# Patient Record
Sex: FEMALE | Race: WHITE | NOT HISPANIC OR LATINO | Employment: STUDENT | URBAN - METROPOLITAN AREA
[De-identification: names, ages, dates, MRNs, and addresses within clinical notes are randomized per-mention and may not be internally consistent; named-entity substitution may affect disease eponyms.]

---

## 2017-05-16 ENCOUNTER — HOSPITAL ENCOUNTER (OUTPATIENT)
Dept: NON INVASIVE DIAGNOSTICS | Facility: CLINIC | Age: 22
Discharge: HOME | End: 2017-05-16
Payer: COMMERCIAL

## 2017-05-16 ENCOUNTER — TRANSCRIBE ORDERS (OUTPATIENT)
Dept: ADMINISTRATIVE | Facility: HOSPITAL | Age: 22
End: 2017-05-16

## 2017-05-16 DIAGNOSIS — Z82.49 FAMILY HISTORY OF ISCHEMIC HEART DISEASE: ICD-10-CM

## 2017-05-16 DIAGNOSIS — Z82.49 FAMILY HISTORY OF ISCHEMIC HEART DISEASE: Primary | ICD-10-CM

## 2017-05-16 PROCEDURE — 93005 ELECTROCARDIOGRAM TRACING: CPT

## 2017-05-17 LAB
ATRIAL RATE: 58 BPM
P AXIS: 55 DEGREES
PR INTERVAL: 152 MS
QRS AXIS: 65 DEGREES
QRSD INTERVAL: 86 MS
QT INTERVAL: 406 MS
QTC INTERVAL: 398 MS
T WAVE AXIS: 47 DEGREES
VENTRICULAR RATE: 58 BPM

## 2017-08-01 ENCOUNTER — ALLSCRIPTS OFFICE VISIT (OUTPATIENT)
Dept: OTHER | Facility: OTHER | Age: 22
End: 2017-08-01

## 2017-08-01 PROCEDURE — G0145 SCR C/V CYTO,THINLAYER,RESCR: HCPCS | Performed by: OBSTETRICS & GYNECOLOGY

## 2017-08-03 ENCOUNTER — LAB REQUISITION (OUTPATIENT)
Dept: LAB | Facility: HOSPITAL | Age: 22
End: 2017-08-03
Payer: COMMERCIAL

## 2017-08-03 DIAGNOSIS — Z12.4 ENCOUNTER FOR SCREENING FOR MALIGNANT NEOPLASM OF CERVIX: ICD-10-CM

## 2017-08-03 PROCEDURE — 87591 N.GONORRHOEAE DNA AMP PROB: CPT | Performed by: OBSTETRICS & GYNECOLOGY

## 2017-08-03 PROCEDURE — 87491 CHLMYD TRACH DNA AMP PROBE: CPT | Performed by: OBSTETRICS & GYNECOLOGY

## 2017-08-08 LAB
CHLAMYDIA DNA CVX QL NAA+PROBE: NORMAL
N GONORRHOEA DNA GENITAL QL NAA+PROBE: NORMAL

## 2017-08-09 LAB
LAB AP GYN PRIMARY INTERPRETATION: NORMAL
Lab: NORMAL

## 2018-01-08 ENCOUNTER — ALLSCRIPTS OFFICE VISIT (OUTPATIENT)
Dept: OTHER | Facility: OTHER | Age: 23
End: 2018-01-08

## 2018-01-08 LAB — HCG, QUALITATIVE (HISTORICAL): NEGATIVE

## 2018-01-12 VITALS
SYSTOLIC BLOOD PRESSURE: 108 MMHG | DIASTOLIC BLOOD PRESSURE: 60 MMHG | HEIGHT: 68 IN | BODY MASS INDEX: 19.85 KG/M2 | WEIGHT: 131 LBS

## 2018-01-18 NOTE — MISCELLANEOUS
Message   Date: 02 Aug 2016 2:20 PM EST, Recorded By: Triston Gutierrez For: Mike Mcbride [employer], Self   Phone: [phone] (Home)   Reason: Other   pt is having issues with her insurance covering her birth control    feel because it is too soon    pt will call insurance company           Active Problems    1  Contraception (V25 9) (Z30 9)   2  Denied: History of self breast exam   3  Visit for routine gyn exam (V72 31) (Z01 419)    Current Meds   1  Aviane 0 1-20 MG-MCG Oral Tablet; Take 1 tablet daily; Therapy: 01Aug2016 to (Last Rx:[id number])  Requested for: 01Aug2016 Ordered   2  Aviane 0 1-20 MG-MCG Oral Tablet; Take 1 tablet daily; Therapy: 87Ngw0552 to (Last Rx:[id number])  Requested for: 01Aug2016 Ordered   3  Ipratropium-Albuterol 0 5-2 5 (3) MG/3ML Inhalation Solution; Therapy: 90PDT8887 to Recorded   4  Shar Fe 1 5/30 1 5-30 MG-MCG Oral Tablet; TAKE 1 TABLET DAILY; Therapy: 26EFK2927 to (Evaluate:66Zwg1783)  Requested for: 03NBK5385; Last   Rx:09Jun2016 Ordered   5  Mobic TABS (Meloxicam); Therapy: (Recorded:41Sqc6221) to Recorded    Allergies    1   No Known Drug Allergies    Signatures   Electronically signed by : Anurag Merino, ; Aug  2 2016  2:26PM EST                       (Author)

## 2018-01-23 VITALS
HEIGHT: 68 IN | WEIGHT: 132.38 LBS | DIASTOLIC BLOOD PRESSURE: 72 MMHG | SYSTOLIC BLOOD PRESSURE: 114 MMHG | BODY MASS INDEX: 20.06 KG/M2

## 2018-08-02 ENCOUNTER — ANNUAL EXAM (OUTPATIENT)
Dept: OBGYN CLINIC | Facility: CLINIC | Age: 23
End: 2018-08-02
Payer: COMMERCIAL

## 2018-08-02 VITALS
SYSTOLIC BLOOD PRESSURE: 114 MMHG | DIASTOLIC BLOOD PRESSURE: 72 MMHG | HEIGHT: 68 IN | WEIGHT: 131.2 LBS | BODY MASS INDEX: 19.88 KG/M2

## 2018-08-02 DIAGNOSIS — Z11.3 SCREENING FOR STD (SEXUALLY TRANSMITTED DISEASE): ICD-10-CM

## 2018-08-02 DIAGNOSIS — Z01.419 WOMEN'S ANNUAL ROUTINE GYNECOLOGICAL EXAMINATION: Primary | ICD-10-CM

## 2018-08-02 PROCEDURE — 87491 CHLMYD TRACH DNA AMP PROBE: CPT | Performed by: OBSTETRICS & GYNECOLOGY

## 2018-08-02 PROCEDURE — 87591 N.GONORRHOEAE DNA AMP PROB: CPT | Performed by: OBSTETRICS & GYNECOLOGY

## 2018-08-02 PROCEDURE — 99395 PREV VISIT EST AGE 18-39: CPT | Performed by: OBSTETRICS & GYNECOLOGY

## 2018-08-02 RX ORDER — LORATADINE 10 MG/1
CAPSULE, LIQUID FILLED ORAL
COMMUNITY

## 2018-08-02 RX ORDER — MAGNESIUM 30 MG
30 TABLET ORAL 2 TIMES DAILY
COMMUNITY

## 2018-08-02 RX ORDER — UBIDECARENONE 75 MG
CAPSULE ORAL DAILY
COMMUNITY

## 2018-08-02 RX ORDER — ERGOCALCIFEROL (VITAMIN D2) 10 MCG
TABLET ORAL
COMMUNITY

## 2018-08-02 RX ORDER — COPPER 313.4 MG/1
INTRAUTERINE DEVICE INTRAUTERINE
COMMUNITY
End: 2021-12-28 | Stop reason: ALTCHOICE

## 2018-08-02 NOTE — PATIENT INSTRUCTIONS
Intrauterine Device   WHAT YOU NEED TO KNOW:   What is an intrauterine device? An intrauterine device (IUD) is a type of birth control that is inserted into your uterus  It is a small, flexible piece of plastic with a string on the end  It is inserted and removed by your healthcare provider  IUDs prevent sperm from reaching or fertilizing an egg  IUDs also prevent a fertilized egg from attaching to the uterus and developing into a fetus  What are the most common types of IUDs? · Copper: This type of IUD slowly releases a small amount of copper into your uterus  This IUD can remain in place for up to 10 years  · Hormone-releasing: This type of IUD slowly releases a small amount of progesterone into your uterus  Progesterone is a hormone that is made by your body to help control your periods  This IUD can remain in place for up to 5 years  What are the advantages of an IUD? · Effective: An IUD is 98% to 99% effective in preventing pregnancy  · Easily removable: The IUD can be removed if you decide to have a baby  You may be able to get pregnant as soon as the IUD is removed  · Immediate:  An IUD protects you from pregnancy right after it is inserted  · Convenient:  You do not have to stop sexual activity to insert it or worry about remembering to take your birth control pill  · Safe:  Copper IUDs are safer for some women than oral birth control pills  Examples include women who smoke or have a history of blood clots  · Health effects:  Hormone-releasing IUDs may decrease certain health problems  Examples include bleeding and cramping that happen with your monthly period  What are the risks of an IUD? · An IUD does not protect you from sexually transmitted infections  You may have cramps during the first weeks after you get the IUD  A copper IUD may cause your monthly period to be heavier or more painful  This is more common within the first 3 months after you get the IUD   You may need to have your IUD removed if your bleeding or pain becomes severe  You may have spotting between periods  · There is a small chance that you could get pregnant  Sometimes the IUD cannot be removed after you get pregnant  This increases your risk of a miscarriage or an ectopic pregnancy  Ectopic pregnancy is when the fertilized egg starts to grow somewhere other than your uterus  There is a small risk of an infection within the first 20 days after the IUD is placed  Infection can lead to pelvic inflammatory disease  This can cause infertility  Your uterus may tear when the IUD is inserted  The IUD may slip part or all of the way out of your uterus  How is the IUD inserted? · The IUD is usually inserted during your monthly period  This may help decrease the amount of discomfort you have during the procedure  It also helps ensure that you are not pregnant  You will be asked to lie down and place your feet in stirrups  Your healthcare provider will gently insert a speculum into your vagina  This is the same tool used during a pap smear  The speculum allows your healthcare provider to see inside your vagina to your cervix  The cervix is the opening of your uterus  · Your healthcare provider will clean your cervix with an antiseptic solution to prevent infection  You may be given numbing medicine  A long plastic tube is gently passed through your cervix and into your uterus  This tube has the IUD inside of it  The IUD is pushed out of the tube and into your uterus  You may have cramps as the IUD is inserted  The tube is removed after the IUD is in place  How can I make sure my IUD is still in place? An IUD has a string made of plastic thread  One to 2 inches of this string hangs into your vagina  You cannot see this string, and it will not cause problems when you have sex  Check your IUD string every 3 days for the first 3 months after it is inserted  After that, check the string after each monthly period   Do the following to check the placement of your IUD:  · Wash your hands with soap and warm water  Dry them with a clean towel  · Bend your knees and squat low to the ground  · Gently put your index finger inside your vagina  The cervix is at the top of the vagina and feels like the tip of your nose  Feel for the IUD string  Do not pull on the string  You should not be able to feel the firm plastic of the IUD itself  · Wash your hands after you check your IUD string  Where can I find more information? · [facility] [address] , [place]  Phone: 7- [phone]  Web Address: [email or web address]://[employer].com.[place]/  org  When should I contact my healthcare provider? · You think you are pregnant  · You bleed after you have sex  · You have pain during sex  · You cannot feel the IUD string, the string feels longer, or you feel the plastic of the IUD itself  · You have vaginal discharge that is green, yellow, or has a foul odor  · You have questions or concerns about your condition or care  When should I seek immediate care? · You have severe pain or bleeding during your period  · You have a fever and severe abdominal pain  CARE AGREEMENT:   You have the right to help plan your care  Learn about your health condition and how it may be treated  Discuss treatment options with your caregivers to decide what care you want to receive  You always have the right to refuse treatment  The above information is an  only  It is not intended as medical advice for individual conditions or treatments  Talk to your doctor, nurse or pharmacist before following any medical regimen to see if it is safe and effective for you  © 2017 [employer] Information is for End User's use only and may not be sold, redistributed or otherwise used for commercial purposes   All illustrations and images included in CareNotes® are the copyrighted property of A D A M , Inc  or [employer]

## 2018-08-02 NOTE — PROGRESS NOTES
Assessment/Plan:  1  Yearly exam-Pap smear deferred, self-breast awareness reviewed  2   Contraception-patient had ParaGard IUD inserted 1/8/18  Other than some rare occasional cramping, she denies any problems  Her partner notes no discomfort with sex  She will continue with ParaGard IUD and call with any issues  The IUD string was noted to be 2 cm and the patient was instructed about how to check for the IUD string  3   STD concerns-patient with a new partner within the past year  GC/chlamydia testing was done as per CDC guidelines with patient request   We will defer any blood testing at this time  4   Other-patient starting [employer] school at [employer] in the fall  My congratulations and support were given  She will follow-up in 1 year or as needed  No problem-specific Assessment & Plan notes found for this encounter  Diagnoses and all orders for this visit:    Women's annual routine gynecological examination    Other orders  -     Multiple Vitamin (DAILY VALUE MULTIVITAMIN) TABS; Take by mouth  -     Loratadine (CLARITIN) 10 MG CAPS; Take by mouth  -     cyanocobalamin (VITAMIN B-12) 100 mcg tablet; Take by mouth daily  -     magnesium 30 MG tablet; Take 30 mg by mouth 2 (two) times a day  -     PARAGARD INTRAUTERINE COPPER IUD; by Intrauterine route          Subjective:      Patient ID: Sage Murcia is a 25 y o  female  Patient was seen today for yearly exam   Please see assessment plan for details  The following portions of the patient's history were reviewed and updated as appropriate: allergies, current medications, past family history, past medical history, past social history, past surgical history and problem list     Review of Systems   Constitutional: Negative for chills, diaphoresis, fatigue and fever  Respiratory: Negative for apnea, cough, chest tightness, shortness of breath and wheezing  Cardiovascular: Negative for chest pain, palpitations and leg swelling  Gastrointestinal: Negative for abdominal distention, abdominal pain, anal bleeding, constipation, diarrhea, nausea, rectal pain and vomiting  Genitourinary: Negative for difficulty urinating, dyspareunia, dysuria, frequency, hematuria, menstrual problem, pelvic pain, urgency, vaginal bleeding, vaginal discharge and vaginal pain  Musculoskeletal: Negative for arthralgias, back pain and myalgias  Skin: Negative for color change and rash  Neurological: Negative for dizziness, syncope, light-headedness, numbness and headaches  Hematological: Negative for adenopathy  Does not bruise/bleed easily  Psychiatric/Behavioral: Negative for dysphoric mood and sleep disturbance  The patient is not nervous/anxious  Objective:      /72 (BP Location: Left arm, Patient Position: Sitting, Cuff Size: Adult)   Ht 5' 8" (1 727 m)   Wt 59 5 kg (131 lb 3 2 oz)   LMP 07/22/2018 (Exact Date)   Breastfeeding? No   BMI 19 95 kg/m²          Physical Exam    Objective      /72 (BP Location: Left arm, Patient Position: Sitting, Cuff Size: Adult)   Ht 5' 8" (1 727 m)   Wt 59 5 kg (131 lb 3 2 oz)   LMP 07/22/2018 (Exact Date)   Breastfeeding? No   BMI 19 95 kg/m²     General:   alert and oriented, in no acute distress, alert, appears stated age and cooperative   Neck: normal to inspection and palpation   Breast: normal appearance, no masses or tenderness   Heart:    Lungs:    Abdomen: soft, non-tender, without masses or organomegaly   Vulva: normal   Vagina: Minimal amount of clear white discharge, without erythema or lesions noted  Cervix: Without lesions or discharge or cervicitis  No Cervical motion tenderness  IUD string seen at a length of 2 cm   and normal   Uterus: normal size, anteverted, non-tender   Adnexa: no mass, fullness, tenderness   Rectum: negative

## 2018-08-03 LAB
CHLAMYDIA DNA CVX QL NAA+PROBE: NORMAL
N GONORRHOEA DNA GENITAL QL NAA+PROBE: NORMAL

## 2019-11-29 ENCOUNTER — ANNUAL EXAM (OUTPATIENT)
Dept: OBGYN CLINIC | Facility: CLINIC | Age: 24
End: 2019-11-29
Payer: COMMERCIAL

## 2019-11-29 VITALS
HEIGHT: 67 IN | SYSTOLIC BLOOD PRESSURE: 110 MMHG | DIASTOLIC BLOOD PRESSURE: 65 MMHG | WEIGHT: 131.6 LBS | BODY MASS INDEX: 20.65 KG/M2

## 2019-11-29 DIAGNOSIS — Z01.419 WOMEN'S ANNUAL ROUTINE GYNECOLOGICAL EXAMINATION: Primary | ICD-10-CM

## 2019-11-29 DIAGNOSIS — B96.89 BV (BACTERIAL VAGINOSIS): ICD-10-CM

## 2019-11-29 DIAGNOSIS — Z11.51 SCREENING FOR HUMAN PAPILLOMAVIRUS (HPV): ICD-10-CM

## 2019-11-29 DIAGNOSIS — N89.8 VAGINAL DISCHARGE: ICD-10-CM

## 2019-11-29 DIAGNOSIS — N76.0 BV (BACTERIAL VAGINOSIS): ICD-10-CM

## 2019-11-29 PROCEDURE — G0145 SCR C/V CYTO,THINLAYER,RESCR: HCPCS | Performed by: OBSTETRICS & GYNECOLOGY

## 2019-11-29 PROCEDURE — S0612 ANNUAL GYNECOLOGICAL EXAMINA: HCPCS | Performed by: OBSTETRICS & GYNECOLOGY

## 2019-11-29 PROCEDURE — 87210 SMEAR WET MOUNT SALINE/INK: CPT | Performed by: OBSTETRICS & GYNECOLOGY

## 2019-11-29 RX ORDER — METRONIDAZOLE 500 MG/1
500 TABLET ORAL EVERY 12 HOURS SCHEDULED
Qty: 14 TABLET | Refills: 0 | Status: SHIPPED | OUTPATIENT
Start: 2019-11-29 | End: 2019-12-06

## 2019-11-29 NOTE — PROGRESS NOTES
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  Complains of vaginal discharge with odor  She does have the ParaGard IUD which is been in place for little more than a year  She is not certain to with that she likes the results for now  Periods are regular every 28-30 days, lasting 5 days  Dysmenorrhea:mild, occurring first 1-2 days of flow and throughout menses  Cyclic symptoms include none  No intermenstrual bleeding, spotting, or discharge  The patient reports that there is not domestic violence in her life  Current contraception: IUD  History of abnormal Pap smear: no  Family history of uterine or ovarian cancer: no  Regular self breast exam: yes  History of abnormal mammogram: no  Family history of breast cancer: no  History of abnormal lipids: no  Menstrual History:  OB History        0    Para   0    Term   0       0    AB   0    Living   0       SAB   0    TAB   0    Ectopic   0    Multiple   0    Live Births   0                Menarche age: 16  Patient's last menstrual period was 2019  Review of Systems  Pertinent items are noted in HPI        Objective All annual exam   /65 (BP Location: Right arm, Patient Position: Sitting, Cuff Size: Standard)   Ht 5' 7" (1 702 m)   Wt 59 7 kg (131 lb 9 6 oz)   LMP 2019   BMI 20 61 kg/m²     General:   alert and oriented, in no acute distress, alert, appears stated age and cooperative   Heart: regular rate and rhythm, S1, S2 normal, no murmur, click, rub or gallop   Lungs: clear to auscultation bilaterally   Abdomen: soft, non-tender, without masses or organomegaly   Vulva: normal   Vagina: normal mucosa, normal discharge   Cervix: no bleeding following Pap, no cervical motion tenderness, no lesions, nulliparous appearance and IUD strings seen from cervical os   Uterus: normal size, mobile, non-tender, normal shape and consistency   Adnexa: normal adnexa and no mass, fullness, tenderness   Bilateral breast exam in the sitting and supine position with chaperone present, no visible or palpable breast lesions identified  No breast masses noted  No supraclavicular or axillary lymphadenopathy noted  No nipple discharge  Reviewed self-breast exam techniques  Rectal exam, deferred

## 2019-12-04 DIAGNOSIS — B37.3 VAGINAL YEAST INFECTION: Primary | ICD-10-CM

## 2019-12-04 LAB
LAB AP GYN PRIMARY INTERPRETATION: NORMAL
Lab: NORMAL
PATH INTERP SPEC-IMP: NORMAL

## 2019-12-04 RX ORDER — FLUCONAZOLE 150 MG/1
150 TABLET ORAL ONCE
Qty: 1 TABLET | Refills: 0 | Status: SHIPPED | OUTPATIENT
Start: 2019-12-04 | End: 2019-12-04

## 2020-09-09 ENCOUNTER — TELEPHONE (OUTPATIENT)
Dept: OBGYN CLINIC | Facility: CLINIC | Age: 25
End: 2020-09-09

## 2020-09-09 NOTE — TELEPHONE ENCOUNTER
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

## 2020-12-22 ENCOUNTER — ANNUAL EXAM (OUTPATIENT)
Dept: OBGYN CLINIC | Facility: CLINIC | Age: 25
End: 2020-12-22
Payer: COMMERCIAL

## 2020-12-22 VITALS
SYSTOLIC BLOOD PRESSURE: 110 MMHG | WEIGHT: 124 LBS | HEIGHT: 68 IN | BODY MASS INDEX: 18.79 KG/M2 | DIASTOLIC BLOOD PRESSURE: 80 MMHG

## 2020-12-22 DIAGNOSIS — Z30.41 ORAL CONTRACEPTIVE USE: ICD-10-CM

## 2020-12-22 DIAGNOSIS — N89.8 VAGINAL DISCHARGE: ICD-10-CM

## 2020-12-22 DIAGNOSIS — Z01.419 WOMEN'S ANNUAL ROUTINE GYNECOLOGICAL EXAMINATION: Primary | ICD-10-CM

## 2020-12-22 DIAGNOSIS — B37.3 VAGINAL YEAST INFECTION: ICD-10-CM

## 2020-12-22 PROCEDURE — 99395 PREV VISIT EST AGE 18-39: CPT | Performed by: OBSTETRICS & GYNECOLOGY

## 2020-12-22 RX ORDER — EAR PLUGS
EACH OTIC (EAR) EVERY 24 HOURS
COMMUNITY

## 2020-12-22 RX ORDER — NORETHINDRONE ACETATE AND ETHINYL ESTRADIOL 1MG-20(21)
KIT ORAL EVERY 24 HOURS
COMMUNITY
Start: 2020-08-18 | End: 2021-12-28 | Stop reason: SDUPTHER

## 2020-12-22 RX ORDER — MULTIVITAMIN WITH IRON
TABLET ORAL EVERY 24 HOURS
COMMUNITY

## 2021-12-28 ENCOUNTER — ANNUAL EXAM (OUTPATIENT)
Dept: OBGYN CLINIC | Facility: CLINIC | Age: 26
End: 2021-12-28
Payer: COMMERCIAL

## 2021-12-28 VITALS
HEIGHT: 68 IN | SYSTOLIC BLOOD PRESSURE: 122 MMHG | DIASTOLIC BLOOD PRESSURE: 82 MMHG | WEIGHT: 134.2 LBS | BODY MASS INDEX: 20.34 KG/M2

## 2021-12-28 DIAGNOSIS — Z30.41 ORAL CONTRACEPTIVE USE: ICD-10-CM

## 2021-12-28 DIAGNOSIS — Z01.419 WOMEN'S ANNUAL ROUTINE GYNECOLOGICAL EXAMINATION: Primary | ICD-10-CM

## 2021-12-28 PROCEDURE — 99395 PREV VISIT EST AGE 18-39: CPT | Performed by: OBSTETRICS & GYNECOLOGY

## 2021-12-28 RX ORDER — NORETHINDRONE ACETATE AND ETHINYL ESTRADIOL 1MG-20(21)
1 KIT ORAL DAILY
Qty: 28 TABLET | Refills: 11 | Status: SHIPPED | OUTPATIENT
Start: 2021-12-28 | End: 2022-01-25